# Patient Record
Sex: FEMALE | Race: OTHER
[De-identification: names, ages, dates, MRNs, and addresses within clinical notes are randomized per-mention and may not be internally consistent; named-entity substitution may affect disease eponyms.]

---

## 2024-10-03 ENCOUNTER — HOSPITAL ENCOUNTER (OUTPATIENT)
Facility: HOSPITAL | Age: 29
Setting detail: SPECIMEN
Discharge: HOME | End: 2024-10-06

## 2024-10-03 ENCOUNTER — OFFICE VISIT (OUTPATIENT)
Age: 29
End: 2024-10-03

## 2024-10-03 VITALS
BODY MASS INDEX: 24.3 KG/M2 | HEIGHT: 56 IN | HEART RATE: 76 BPM | SYSTOLIC BLOOD PRESSURE: 97 MMHG | WEIGHT: 108 LBS | TEMPERATURE: 98.1 F | OXYGEN SATURATION: 98 % | RESPIRATION RATE: 14 BRPM | DIASTOLIC BLOOD PRESSURE: 64 MMHG

## 2024-10-03 DIAGNOSIS — R63.0 LOSS OF APPETITE: ICD-10-CM

## 2024-10-03 DIAGNOSIS — Z30.9 ENCOUNTER FOR CONTRACEPTIVE MANAGEMENT, UNSPECIFIED TYPE: ICD-10-CM

## 2024-10-03 DIAGNOSIS — R10.13 EPIGASTRIC PAIN: ICD-10-CM

## 2024-10-03 LAB
ALBUMIN SERPL-MCNC: 3.7 G/DL (ref 3.4–5)
ALBUMIN/GLOB SERPL: 0.9 (ref 0.8–1.7)
ALP SERPL-CCNC: 108 U/L (ref 45–117)
ALT SERPL-CCNC: 14 U/L (ref 13–56)
ANION GAP SERPL CALC-SCNC: 4 MMOL/L (ref 3–18)
AST SERPL-CCNC: 8 U/L (ref 10–38)
BASOPHILS # BLD: 0 K/UL (ref 0–0.1)
BASOPHILS NFR BLD: 0 % (ref 0–2)
BILIRUB SERPL-MCNC: 0.4 MG/DL (ref 0.2–1)
BILIRUBIN, URINE, POC: NEGATIVE
BLOOD URINE, POC: NEGATIVE
BUN SERPL-MCNC: 7 MG/DL (ref 7–18)
BUN/CREAT SERPL: 10 (ref 12–20)
CALCIUM SERPL-MCNC: 9.6 MG/DL (ref 8.5–10.1)
CHLORIDE SERPL-SCNC: 106 MMOL/L (ref 100–111)
CO2 SERPL-SCNC: 27 MMOL/L (ref 21–32)
CREAT SERPL-MCNC: 0.69 MG/DL (ref 0.6–1.3)
DIFFERENTIAL METHOD BLD: ABNORMAL
EOSINOPHIL # BLD: 0.1 K/UL (ref 0–0.4)
EOSINOPHIL NFR BLD: 1 % (ref 0–5)
ERYTHROCYTE [DISTWIDTH] IN BLOOD BY AUTOMATED COUNT: 14.1 % (ref 11.6–14.5)
GLOBULIN SER CALC-MCNC: 4.2 G/DL (ref 2–4)
GLUCOSE SERPL-MCNC: 106 MG/DL (ref 74–99)
GLUCOSE URINE, POC: NEGATIVE
HCG, PREGNANCY, URINE, POC: NEGATIVE
HCT VFR BLD AUTO: 43.8 % (ref 35–45)
HGB BLD-MCNC: 13.5 G/DL (ref 12–16)
IMM GRANULOCYTES # BLD AUTO: 0 K/UL (ref 0–0.04)
IMM GRANULOCYTES NFR BLD AUTO: 0 % (ref 0–0.5)
KETONES, URINE, POC: NEGATIVE
LEUKOCYTE ESTERASE, URINE, POC: NEGATIVE
LYMPHOCYTES # BLD: 1.9 K/UL (ref 0.9–3.6)
LYMPHOCYTES NFR BLD: 22 % (ref 21–52)
MCH RBC QN AUTO: 26.4 PG (ref 24–34)
MCHC RBC AUTO-ENTMCNC: 30.8 G/DL (ref 31–37)
MCV RBC AUTO: 85.7 FL (ref 78–100)
MONOCYTES # BLD: 0.5 K/UL (ref 0.05–1.2)
MONOCYTES NFR BLD: 6 % (ref 3–10)
NEUTS SEG # BLD: 6.3 K/UL (ref 1.8–8)
NEUTS SEG NFR BLD: 71 % (ref 40–73)
NITRITE, URINE, POC: NEGATIVE
NRBC # BLD: 0 K/UL (ref 0–0.01)
NRBC BLD-RTO: 0 PER 100 WBC
PH, URINE, POC: 6 (ref 4.6–8)
PLATELET # BLD AUTO: 398 K/UL (ref 135–420)
PMV BLD AUTO: 10.1 FL (ref 9.2–11.8)
POTASSIUM SERPL-SCNC: 4.7 MMOL/L (ref 3.5–5.5)
PROT SERPL-MCNC: 7.9 G/DL (ref 6.4–8.2)
PROTEIN,URINE, POC: NEGATIVE
RBC # BLD AUTO: 5.11 M/UL (ref 4.2–5.3)
SODIUM SERPL-SCNC: 137 MMOL/L (ref 136–145)
SPECIFIC GRAVITY, URINE, POC: 1.01 (ref 1–1.03)
URINALYSIS CLARITY, POC: CLEAR
URINALYSIS COLOR, POC: YELLOW
UROBILINOGEN, POC: NORMAL
VALID INTERNAL CONTROL, POC: YES
WBC # BLD AUTO: 8.9 K/UL (ref 4.6–13.2)

## 2024-10-03 PROCEDURE — 80053 COMPREHEN METABOLIC PANEL: CPT

## 2024-10-03 PROCEDURE — 85025 COMPLETE CBC W/AUTO DIFF WBC: CPT

## 2024-10-03 RX ORDER — PANTOPRAZOLE SODIUM 40 MG/1
40 TABLET, DELAYED RELEASE ORAL
Qty: 30 TABLET | Refills: 2 | Status: SHIPPED | OUTPATIENT
Start: 2024-10-03

## 2024-10-03 ASSESSMENT — PATIENT HEALTH QUESTIONNAIRE - PHQ9
SUM OF ALL RESPONSES TO PHQ QUESTIONS 1-9: 2
SUM OF ALL RESPONSES TO PHQ QUESTIONS 1-9: 2
1. LITTLE INTEREST OR PLEASURE IN DOING THINGS: SEVERAL DAYS
SUM OF ALL RESPONSES TO PHQ QUESTIONS 1-9: 2
2. FEELING DOWN, DEPRESSED OR HOPELESS: SEVERAL DAYS
SUM OF ALL RESPONSES TO PHQ QUESTIONS 1-9: 2
SUM OF ALL RESPONSES TO PHQ9 QUESTIONS 1 & 2: 2

## 2024-10-03 NOTE — PROGRESS NOTES
HPI  Ranjana Beavers is a 29 y.o. female being seen today for   Chief Complaint   Patient presents with    No Appetite     X1 month    Labs Only     Patient would like a pregnancy test     [facility].  Moved here from [place] about a month ago.  Previously lived in [facility], moved to [place] a [place] ago.    Patient is reporting loss of appetite x one month.  LMP 9/17/24. Requesting pregnancy test and would like contraceptives if she is not pregnant.  Complains of mild epigastric pain, burning, sometimes nauseated. Denies fever/chills, v/d. Does not seem to be related to any particular food.       No past medical history on file.      ROS  Patient states that she is feeling well. Denies complaints of chest pain, shortness of breath, swelling of legs, dizziness or weakness. she denies vomiting or diarrhea.        Current Outpatient Medications   Medication Sig    pantoprazole (PROTONIX) 40 MG tablet Take 1 tablet by mouth every morning (before breakfast)     No current facility-administered medications for this visit.       PE  BP 97/64 (Site: Left Upper Arm, Position: Sitting, Cuff Size: Medium Adult)   Pulse 76   Temp 98.1 °F (36.7 °C) (Temporal)   Resp 14   Ht 1.41 m (4' 7.51\")   Wt 49 kg (108 lb)   LMP 09/17/2024 (Approximate)   SpO2 98%   BMI 24.64 kg/m²      Alert and oriented with normal mood and affect. she is well developed and well nourished . Lungs are clear without wheezing. Heart rate is regular without murmurs or gallops. There is no lower extremity edema.   Abd is soft, good bowel sounds. Mild ttp epigastric area. No masses.    Results for orders placed or performed in visit on 10/03/24   AMB POC URINE PREGNANCY TEST, VISUAL COLOR COMPARISON   Result Value Ref Range    Valid Internal Control, POC yes     HCG, Pregnancy, Urine, POC Negative    AMB POC URINALYSIS DIP STICK AUTO W/O MICRO   Result Value Ref Range    Color,

## 2024-10-03 NOTE — PATIENT INSTRUCTIONS
[facility]  Address: [address] [place] [address], [place] [address]  Hours:   Thursday 9?AM-5?PM   Friday 9?AM-5?PM   Saturday 9?AM-5?PM   Sunday Closed   Monday 10?AM-6?PM   Tuesday 9?AM-5?PM   Wednesday 9?AM-5?PM   Suggest new hours    Phone: [phone]

## 2024-10-03 NOTE — PROGRESS NOTES
Patient instructed on how to collect stool  for H pylori via  ([employer] language [employer])    Discharge instructions reviewed with patient via  ([employer])      Medication list and understanding of medications reviewed with patient via  ([employer])   .   OTC and herbal medications reviewed and added to med list if applicable  Barriers to adherence assessed.    Guidance given regarding new medications this visit, including reason for taking this medicine, and common side effects.     AVS given to patient. Explained to patient  via  ([employer]) . Patient expressed understanding via  ([employer])  .

## 2024-10-03 NOTE — PROGRESS NOTES
Patient presents for lab draw ordered by:    Ordering Provider:  Marlena Julio  Ordering Department/Practice:  [facility]  Phone:  [phone]  Date Ordered:  10/3/2024    The following labs were drawn and sent to [facility] by Gwen Jimenes:    CMP CBC    The following tubes were  1 gold 1 lava    Draw site right brachial.  Patient tolerated draw with no distress.

## 2025-02-14 ENCOUNTER — TELEPHONE (OUTPATIENT)
Age: 30
End: 2025-02-14

## 2025-02-14 NOTE — TELEPHONE ENCOUNTER
CUEVAS     Spoke with patient using an . Patient schedule for an follow up visit on 2/20/25 at the [facility]. Appointment information text to patient.